# Patient Record
Sex: FEMALE | Employment: FULL TIME | URBAN - METROPOLITAN AREA
[De-identification: names, ages, dates, MRNs, and addresses within clinical notes are randomized per-mention and may not be internally consistent; named-entity substitution may affect disease eponyms.]

---

## 2019-09-13 ENCOUNTER — TRANSFERRED RECORDS (OUTPATIENT)
Dept: HEALTH INFORMATION MANAGEMENT | Facility: CLINIC | Age: 64
End: 2019-09-13

## 2019-10-21 ENCOUNTER — OFFICE VISIT (OUTPATIENT)
Dept: NEUROLOGY | Facility: CLINIC | Age: 64
End: 2019-10-21
Payer: COMMERCIAL

## 2019-10-21 DIAGNOSIS — R20.9 DISTURBANCE OF SKIN SENSATION: Primary | ICD-10-CM

## 2019-10-21 PROCEDURE — 95908 NRV CNDJ TST 3-4 STUDIES: CPT | Performed by: PSYCHIATRY & NEUROLOGY

## 2019-10-21 PROCEDURE — 95886 MUSC TEST DONE W/N TEST COMP: CPT | Performed by: PSYCHIATRY & NEUROLOGY

## 2019-10-21 NOTE — LETTER
10/21/2019         RE: Roslyn Bonner  [address]  [facility] [address]        Dear Colleague,    Thank you for referring your patient, Roslyn Bonner, to the [facility]. Please see a copy of my visit note below.    [facility]      Nerve Conduction & EMG Report          Patient:       Roslyn Bonner  Patient ID:    [id number]  Gender:        Female  YOB: 1955  Age:           63 Years 11 Months      History and Examination:  Roslyn Bonner is a 63 year old woman with several months  of numbness and pain in the left foot. Numbness was initially most prominent in the superolateral aspect of the foot. Symptoms are improving with use of an in-shoe orthotic and with time.     Techniques:  Motor conduction studies were done with surface recording electrodes. Sensory conduction studies were performed with surface electrodes, unless indicated otherwise by (n), designating the use of subdermal recording electrodes. Temperature was monitored and recorded throughout the study. Lower extremities were maintained at a temperature of 31 degrees Centigrade or higher. EMG was done with a concentric needle electrode.     Results:  Left sural and superficial peroneal sensory conduction studies were normal. Left peroneal and tibial motor conduction studies were normal. Electromyography of the left lower limb was normal.     Interpretation:  This is a normal study. In particular, there is no electrophysiologic evidence of mononeuropathy, polyneuropathy, or lumbosacral radiculopathy.    Juan Alvarado MD        Sensory NCS      Nerve / Sites Rec. Site Onset Peak NP Amp Ref. PP Amp Dist Shashank Ref. Temp     ms ms  V  V  V cm m/s m/s  C   L SURAL - Lat Mall 60      Calf Ankle 3.23 4.38 7.0 5.0 6.7 14 43.4 38.0 31.1   L SUP PERONEAL      Lat Leg Rodriguez 2.60 3.39 6.3  8.3 12.5 48.0 38.0 31       Motor NCS      Nerve / Sites Rec. Site Lat Ref. Amp Ref. Rel Amp  Dist Shashank Ref. Dur. Area Temp.     ms ms mV mV % cm m/s m/s ms %  C   L DEEP PERONEAL - EDB 60      Ankle EDB 3.44 6.00 4.2 2.0 100 8   7.24 100 31.5      FibHead EDB 10.42  4.1  97.5 29 41.6 38.0 7.55 91.4 31.7      Pop Fos EDB 11.93  4.0  96.4 7 46.3 38.0 7.40 92.6 31.9   L TIBIAL - AH      Ankle AH 3.33 6.00 11.7 4.0 100 8   5.99 100 31.2      Pop Fos AH 11.93  6.0  51.3 40 46.5 38.0 6.30 92.4 31.1       F  Wave      Nerve Min F Lat Max F Lat Mean FLat Temp.    ms ms ms  C   L TIBIAL 49.22 50.63 49.87 30.8       EMG Summary Table     Spontaneous MUAP Recruitment    IA Fib PSW Fasc H.F. Amp Dur. PPP Pattern   L. TIB ANTERIOR N None None None None N N N N   L. GASTROCN (MED) N None None None None N N N N   L. VAST LATERALIS N None None None None N N N N   L. BIC FEM (S HEAD) N None None None None N N N N   L. GLUTEUS MED N None None None None N N N N   L. S1 PSP N None None None None N N N N                      Again, thank you for allowing me to participate in the care of your patient.        Sincerely,        Juan Alvarado MD

## 2019-10-21 NOTE — PROGRESS NOTES
[facility]      Nerve Conduction & EMG Report          Patient:       Roslyn Bonner  Patient ID:    [id number]  Gender:        Female  YOB: 1955  Age:           63 Years 11 Months      History and Examination:  Rolsyn Bonner is a 63 year old woman with several months  of numbness and pain in the left foot. Numbness was initially most prominent in the superolateral aspect of the foot. Symptoms are improving with use of an in-shoe orthotic and with time.     Techniques:  Motor conduction studies were done with surface recording electrodes. Sensory conduction studies were performed with surface electrodes, unless indicated otherwise by (n), designating the use of subdermal recording electrodes. Temperature was monitored and recorded throughout the study. Lower extremities were maintained at a temperature of 31 degrees Centigrade or higher. EMG was done with a concentric needle electrode.     Results:  Left sural and superficial peroneal sensory conduction studies were normal. Left peroneal and tibial motor conduction studies were normal. Electromyography of the left lower limb was normal.     Interpretation:  This is a normal study. In particular, there is no electrophysiologic evidence of mononeuropathy, polyneuropathy, or lumbosacral radiculopathy.    Juan Alvarado MD        Sensory NCS      Nerve / Sites Rec. Site Onset Peak NP Amp Ref. PP Amp Dist Shashank Ref. Temp     ms ms  V  V  V cm m/s m/s  C   L SURAL - Lat Mall 60      Calf Ankle 3.23 4.38 7.0 5.0 6.7 14 43.4 38.0 31.1   L SUP PERONEAL      Lat Leg Rodriguez 2.60 3.39 6.3  8.3 12.5 48.0 38.0 31       Motor NCS      Nerve / Sites Rec. Site Lat Ref. Amp Ref. Rel Amp Dist Shashank Ref. Dur. Area Temp.     ms ms mV mV % cm m/s m/s ms %  C   L DEEP PERONEAL - EDB 60      Ankle EDB 3.44 6.00 4.2 2.0 100 8   7.24 100 31.5      FibHead EDB 10.42  4.1  97.5 29 41.6 38.0 7.55 91.4 31.7      Pop Fos EDB 11.93  4.0  96.4 7 46.3  38.0 7.40 92.6 31.9   L TIBIAL - AH      Ankle AH 3.33 6.00 11.7 4.0 100 8   5.99 100 31.2      Pop Fos AH 11.93  6.0  51.3 40 46.5 38.0 6.30 92.4 31.1       F  Wave      Nerve Min F Lat Max F Lat Mean FLat Temp.    ms ms ms  C   L TIBIAL 49.22 50.63 49.87 30.8       EMG Summary Table     Spontaneous MUAP Recruitment    IA Fib PSW Fasc H.F. Amp Dur. PPP Pattern   L. TIB ANTERIOR N None None None None N N N N   L. GASTROCN (MED) N None None None None N N N N   L. VAST LATERALIS N None None None None N N N N   L. BIC FEM (S HEAD) N None None None None N N N N   L. GLUTEUS MED N None None None None N N N N   L. S1 PSP N None None None None N N N N